# Patient Record
Sex: MALE | Race: WHITE | Employment: FULL TIME | URBAN - METROPOLITAN AREA
[De-identification: names, ages, dates, MRNs, and addresses within clinical notes are randomized per-mention and may not be internally consistent; named-entity substitution may affect disease eponyms.]

---

## 2019-08-26 ENCOUNTER — HOSPITAL ENCOUNTER (OUTPATIENT)
Dept: LAB | Age: 59
Discharge: HOME | End: 2019-08-26
Attending: UROLOGY
Payer: COMMERCIAL

## 2019-08-26 ENCOUNTER — ANESTHESIA EVENT (OUTPATIENT)
Dept: SURGERY | Age: 59
End: 2019-08-26
Payer: COMMERCIAL

## 2019-08-26 PROBLEM — N13.2 HYDRONEPHROSIS WITH RENAL AND URETERAL CALCULOUS OBSTRUCTION: Status: ACTIVE | Noted: 2019-08-26

## 2019-08-26 LAB
ALBUMIN SERPL-MCNC: 3.4 G/DL (ref 3.4–5)
ALBUMIN/GLOB SERPL: 1.2 {RATIO} (ref 0.8–1.7)
ALP SERPL-CCNC: 121 U/L (ref 45–117)
ALT SERPL-CCNC: 22 U/L (ref 16–61)
ANION GAP SERPL CALC-SCNC: 4 MMOL/L (ref 3–18)
AST SERPL-CCNC: 14 U/L (ref 10–38)
BILIRUB SERPL-MCNC: 0.5 MG/DL (ref 0.2–1)
BUN SERPL-MCNC: 24 MG/DL (ref 7–18)
BUN/CREAT SERPL: 13 (ref 12–20)
CALCIUM SERPL-MCNC: 8.4 MG/DL (ref 8.5–10.1)
CHLORIDE SERPL-SCNC: 108 MMOL/L (ref 100–111)
CO2 SERPL-SCNC: 30 MMOL/L (ref 21–32)
CREAT SERPL-MCNC: 1.89 MG/DL (ref 0.6–1.3)
GLOBULIN SER CALC-MCNC: 2.9 G/DL (ref 2–4)
GLUCOSE SERPL-MCNC: 89 MG/DL (ref 74–99)
HCT VFR BLD AUTO: 39.7 % (ref 36–48)
HGB BLD-MCNC: 13.2 G/DL (ref 13–16)
POTASSIUM SERPL-SCNC: 4.3 MMOL/L (ref 3.5–5.5)
PROT SERPL-MCNC: 6.3 G/DL (ref 6.4–8.2)
SODIUM SERPL-SCNC: 142 MMOL/L (ref 136–145)

## 2019-08-26 PROCEDURE — 80053 COMPREHEN METABOLIC PANEL: CPT

## 2019-08-26 PROCEDURE — 36415 COLL VENOUS BLD VENIPUNCTURE: CPT

## 2019-08-26 PROCEDURE — 85018 HEMOGLOBIN: CPT

## 2019-08-27 ENCOUNTER — ANESTHESIA (OUTPATIENT)
Dept: SURGERY | Age: 59
End: 2019-08-27
Payer: COMMERCIAL

## 2019-08-27 ENCOUNTER — HOSPITAL ENCOUNTER (OUTPATIENT)
Age: 59
Setting detail: OUTPATIENT SURGERY
Discharge: HOME | End: 2019-08-27
Attending: UROLOGY | Admitting: UROLOGY
Payer: COMMERCIAL

## 2019-08-27 ENCOUNTER — APPOINTMENT (OUTPATIENT)
Dept: GENERAL RADIOLOGY | Age: 59
End: 2019-08-27
Attending: UROLOGY
Payer: COMMERCIAL

## 2019-08-27 VITALS
RESPIRATION RATE: 14 BRPM | WEIGHT: 219.1 LBS | SYSTOLIC BLOOD PRESSURE: 136 MMHG | HEIGHT: 70 IN | HEART RATE: 62 BPM | OXYGEN SATURATION: 100 % | DIASTOLIC BLOOD PRESSURE: 88 MMHG | TEMPERATURE: 97.1 F | BODY MASS INDEX: 31.37 KG/M2

## 2019-08-27 DIAGNOSIS — N20.0 RENAL CALCULI: Primary | ICD-10-CM

## 2019-08-27 DIAGNOSIS — N13.2 HYDRONEPHROSIS WITH RENAL AND URETERAL CALCULOUS OBSTRUCTION: ICD-10-CM

## 2019-08-27 PROCEDURE — 76060000033 HC ANESTHESIA 1 TO 1.5 HR: Performed by: UROLOGY

## 2019-08-27 PROCEDURE — 74011250636 HC RX REV CODE- 250/636: Performed by: UROLOGY

## 2019-08-27 PROCEDURE — 76210000020 HC REC RM PH II FIRST 0.5 HR: Performed by: UROLOGY

## 2019-08-27 PROCEDURE — 76010000149 HC OR TIME 1 TO 1.5 HR: Performed by: UROLOGY

## 2019-08-27 PROCEDURE — 77030014023 HC SYR INFL LVEEN BSC -B: Performed by: UROLOGY

## 2019-08-27 PROCEDURE — 77030040361 HC SLV COMPR DVT MDII -B: Performed by: UROLOGY

## 2019-08-27 PROCEDURE — 77030010103 HC SEAL PRT ENDOSC OCOA -B: Performed by: UROLOGY

## 2019-08-27 PROCEDURE — 77030020782 HC GWN BAIR PAWS FLX 3M -B: Performed by: UROLOGY

## 2019-08-27 PROCEDURE — C1758 CATHETER, URETERAL: HCPCS | Performed by: UROLOGY

## 2019-08-27 PROCEDURE — 74011250636 HC RX REV CODE- 250/636

## 2019-08-27 PROCEDURE — 76210000016 HC OR PH I REC 1 TO 1.5 HR: Performed by: UROLOGY

## 2019-08-27 PROCEDURE — C2617 STENT, NON-COR, TEM W/O DEL: HCPCS | Performed by: UROLOGY

## 2019-08-27 PROCEDURE — C1894 INTRO/SHEATH, NON-LASER: HCPCS | Performed by: UROLOGY

## 2019-08-27 PROCEDURE — 77030012508 HC MSK AIRWY LMA AMBU -A: Performed by: NURSE ANESTHETIST, CERTIFIED REGISTERED

## 2019-08-27 PROCEDURE — C1769 GUIDE WIRE: HCPCS | Performed by: UROLOGY

## 2019-08-27 PROCEDURE — 82360 CALCULUS ASSAY QUANT: CPT

## 2019-08-27 PROCEDURE — 77030018836 HC SOL IRR NACL ICUM -A: Performed by: UROLOGY

## 2019-08-27 PROCEDURE — 77030006974 HC BSKT URET RTVR BSC -C: Performed by: UROLOGY

## 2019-08-27 PROCEDURE — 74011636320 HC RX REV CODE- 636/320: Performed by: UROLOGY

## 2019-08-27 PROCEDURE — 74420 UROGRAPHY RTRGR +-KUB: CPT

## 2019-08-27 DEVICE — VARIABLE LENGTH URETERAL STENT
Type: IMPLANTABLE DEVICE | Site: URETER | Status: FUNCTIONAL
Brand: CONTOUR VL™

## 2019-08-27 RX ORDER — PROPOFOL 10 MG/ML
INJECTION, EMULSION INTRAVENOUS AS NEEDED
Status: DISCONTINUED | OUTPATIENT
Start: 2019-08-27 | End: 2019-08-27 | Stop reason: HOSPADM

## 2019-08-27 RX ORDER — SODIUM CHLORIDE 0.9 % (FLUSH) 0.9 %
5-40 SYRINGE (ML) INJECTION AS NEEDED
Status: DISCONTINUED | OUTPATIENT
Start: 2019-08-27 | End: 2019-08-27 | Stop reason: HOSPADM

## 2019-08-27 RX ORDER — SODIUM CHLORIDE, SODIUM LACTATE, POTASSIUM CHLORIDE, CALCIUM CHLORIDE 600; 310; 30; 20 MG/100ML; MG/100ML; MG/100ML; MG/100ML
1000 INJECTION, SOLUTION INTRAVENOUS CONTINUOUS
Status: DISCONTINUED | OUTPATIENT
Start: 2019-08-27 | End: 2019-08-27 | Stop reason: HOSPADM

## 2019-08-27 RX ORDER — SODIUM CHLORIDE, SODIUM LACTATE, POTASSIUM CHLORIDE, CALCIUM CHLORIDE 600; 310; 30; 20 MG/100ML; MG/100ML; MG/100ML; MG/100ML
125 INJECTION, SOLUTION INTRAVENOUS CONTINUOUS
Status: DISCONTINUED | OUTPATIENT
Start: 2019-08-27 | End: 2019-08-27 | Stop reason: HOSPADM

## 2019-08-27 RX ORDER — OXYCODONE AND ACETAMINOPHEN 5; 325 MG/1; MG/1
1 TABLET ORAL
Qty: 25 TAB | Refills: 0 | Status: SHIPPED | OUTPATIENT
Start: 2019-08-27 | End: 2019-08-30

## 2019-08-27 RX ORDER — HYDROMORPHONE HYDROCHLORIDE 2 MG/ML
0.5 INJECTION, SOLUTION INTRAMUSCULAR; INTRAVENOUS; SUBCUTANEOUS
Status: DISCONTINUED | OUTPATIENT
Start: 2019-08-27 | End: 2019-08-27

## 2019-08-27 RX ORDER — CEPHALEXIN 500 MG/1
500 CAPSULE ORAL 3 TIMES DAILY
Qty: 15 CAP | Refills: 0 | Status: SHIPPED | OUTPATIENT
Start: 2019-08-27 | End: 2019-09-11

## 2019-08-27 RX ORDER — HYDROMORPHONE HYDROCHLORIDE 2 MG/ML
0.5 INJECTION, SOLUTION INTRAMUSCULAR; INTRAVENOUS; SUBCUTANEOUS
Status: DISCONTINUED | OUTPATIENT
Start: 2019-08-27 | End: 2019-08-27 | Stop reason: HOSPADM

## 2019-08-27 RX ORDER — SODIUM CHLORIDE 0.9 % (FLUSH) 0.9 %
5-40 SYRINGE (ML) INJECTION EVERY 8 HOURS
Status: DISCONTINUED | OUTPATIENT
Start: 2019-08-27 | End: 2019-08-27 | Stop reason: HOSPADM

## 2019-08-27 RX ORDER — FLUMAZENIL 0.1 MG/ML
0.2 INJECTION INTRAVENOUS
Status: DISCONTINUED | OUTPATIENT
Start: 2019-08-27 | End: 2019-08-27 | Stop reason: HOSPADM

## 2019-08-27 RX ORDER — DEXAMETHASONE SODIUM PHOSPHATE 4 MG/ML
INJECTION, SOLUTION INTRA-ARTICULAR; INTRALESIONAL; INTRAMUSCULAR; INTRAVENOUS; SOFT TISSUE AS NEEDED
Status: DISCONTINUED | OUTPATIENT
Start: 2019-08-27 | End: 2019-08-27 | Stop reason: HOSPADM

## 2019-08-27 RX ORDER — FENTANYL CITRATE 50 UG/ML
25 INJECTION, SOLUTION INTRAMUSCULAR; INTRAVENOUS AS NEEDED
Status: DISCONTINUED | OUTPATIENT
Start: 2019-08-27 | End: 2019-08-27 | Stop reason: HOSPADM

## 2019-08-27 RX ORDER — FENTANYL CITRATE 50 UG/ML
INJECTION, SOLUTION INTRAMUSCULAR; INTRAVENOUS AS NEEDED
Status: DISCONTINUED | OUTPATIENT
Start: 2019-08-27 | End: 2019-08-27 | Stop reason: HOSPADM

## 2019-08-27 RX ORDER — NALOXONE HYDROCHLORIDE 0.4 MG/ML
0.1 INJECTION, SOLUTION INTRAMUSCULAR; INTRAVENOUS; SUBCUTANEOUS AS NEEDED
Status: DISCONTINUED | OUTPATIENT
Start: 2019-08-27 | End: 2019-08-27 | Stop reason: HOSPADM

## 2019-08-27 RX ORDER — MIDAZOLAM HYDROCHLORIDE 1 MG/ML
INJECTION, SOLUTION INTRAMUSCULAR; INTRAVENOUS AS NEEDED
Status: DISCONTINUED | OUTPATIENT
Start: 2019-08-27 | End: 2019-08-27 | Stop reason: HOSPADM

## 2019-08-27 RX ORDER — CEFAZOLIN SODIUM/WATER 2 G/20 ML
2 SYRINGE (ML) INTRAVENOUS ONCE
Status: COMPLETED | OUTPATIENT
Start: 2019-08-27 | End: 2019-08-27

## 2019-08-27 RX ORDER — ONDANSETRON 2 MG/ML
INJECTION INTRAMUSCULAR; INTRAVENOUS AS NEEDED
Status: DISCONTINUED | OUTPATIENT
Start: 2019-08-27 | End: 2019-08-27 | Stop reason: HOSPADM

## 2019-08-27 RX ORDER — MAGNESIUM SULFATE 100 %
4 CRYSTALS MISCELLANEOUS AS NEEDED
Status: DISCONTINUED | OUTPATIENT
Start: 2019-08-27 | End: 2019-08-27 | Stop reason: HOSPADM

## 2019-08-27 RX ORDER — LIDOCAINE HYDROCHLORIDE 20 MG/ML
INJECTION, SOLUTION EPIDURAL; INFILTRATION; INTRACAUDAL; PERINEURAL AS NEEDED
Status: DISCONTINUED | OUTPATIENT
Start: 2019-08-27 | End: 2019-08-27 | Stop reason: HOSPADM

## 2019-08-27 RX ADMIN — PROPOFOL 200 MG: 10 INJECTION, EMULSION INTRAVENOUS at 13:14

## 2019-08-27 RX ADMIN — LIDOCAINE HYDROCHLORIDE 100 MG: 20 INJECTION, SOLUTION EPIDURAL; INFILTRATION; INTRACAUDAL; PERINEURAL at 13:14

## 2019-08-27 RX ADMIN — Medication 2 G: at 13:19

## 2019-08-27 RX ADMIN — FENTANYL CITRATE 50 MCG: 50 INJECTION, SOLUTION INTRAMUSCULAR; INTRAVENOUS at 13:24

## 2019-08-27 RX ADMIN — MIDAZOLAM HYDROCHLORIDE 2 MG: 1 INJECTION, SOLUTION INTRAMUSCULAR; INTRAVENOUS at 13:08

## 2019-08-27 RX ADMIN — DEXAMETHASONE SODIUM PHOSPHATE 4 MG: 4 INJECTION, SOLUTION INTRA-ARTICULAR; INTRALESIONAL; INTRAMUSCULAR; INTRAVENOUS; SOFT TISSUE at 13:24

## 2019-08-27 RX ADMIN — SODIUM CHLORIDE, SODIUM LACTATE, POTASSIUM CHLORIDE, AND CALCIUM CHLORIDE 125 ML/HR: 600; 310; 30; 20 INJECTION, SOLUTION INTRAVENOUS at 12:01

## 2019-08-27 RX ADMIN — FENTANYL CITRATE 25 MCG: 50 INJECTION, SOLUTION INTRAMUSCULAR; INTRAVENOUS at 14:17

## 2019-08-27 RX ADMIN — SODIUM CHLORIDE, SODIUM LACTATE, POTASSIUM CHLORIDE, AND CALCIUM CHLORIDE: 600; 310; 30; 20 INJECTION, SOLUTION INTRAVENOUS at 13:53

## 2019-08-27 RX ADMIN — ONDANSETRON 4 MG: 2 INJECTION INTRAMUSCULAR; INTRAVENOUS at 13:24

## 2019-08-27 RX ADMIN — FENTANYL CITRATE 25 MCG: 50 INJECTION, SOLUTION INTRAMUSCULAR; INTRAVENOUS at 13:55

## 2019-08-27 NOTE — H&P
Urology [address]  [address]  Tel: [phone]  Fax: [phone]        Patient: Jose Roberto Roy  YOB: 1960          Assessment/Plan  # Detail Type Description    1. Assessment Hydronephrosis with ureteral calculous obstruction (N13.2). Patient Plan He has bialteral hydro and ureteral stones and bilateral renal stones. 2. Assessment Kidney stone (N20.0). Patient Plan We will take care of the right renal stones at tome of right ureteral stone and treat the left side at a later date if everything goes according to plan. Plan Orders Basic Metabolic Panel (8) to be performed and Comp. Metabolic Panel (14) to be performed. 3. Assessment Other acute renal failure (N17.8). Patient Plan He should improve with stents. This 61year old male presents for Kidney and/or Ureteral Stone. History of Present Illness:  1. Kidney and/or Ureteral Stone   Onset was 3 days ago. Severity level is severe. It occurs intermittently. The problem is worse. Location of pain is right flank. The pain does not radiate. Prior stone type of calcium phosphate. The patient lists no relieving factors. Pertinent negatives include chills, constipation, diarrhea, fever, nausea and vomiting.      6/2019 -- He ahd a left PCNL complicated by severe post-op bleeding and a few embolizations of the left kidney and he had some nerve injury related to the procedure too.         7/2019 : 24-hour urine studies:  low UOP = 0.67 - 1.57 L per day. Very high SSCaOx and low citrate, and high oxalate      8/26/19 -- He had some left flank pain 2 months ago that resolved. Now he has sudden right flank pain and nausea. It was horrible 2 days ago and then subsided. It is improved today, but it was far worse than anything he has had before.     creat = 1.4 (8/23/19)  CT (8/23/19) -- 9mm stone in right ureter with hydro and multiple small stones in left ureter with mild left hydro and a bilateral renal stones. PAST MEDICAL/SURGICAL HISTORY   (Reviewed, updated)    Disease/disorder Onset Date Management Date Comments     Gastric bypass     Kidney stones             PROBLEM LIST:   Problem List reviewed. Problem Description Onset Date Chronic Clinical Status Notes   BMI 40.0-44.9, adult 2016 Y     Insomnia 2014 Y           Medications (active prior to today)  Medication Instructions Start Date Stop Date Refilled Elsewhere   cyanocobalamin (vit B-12) 100 mcg tablet  2017   N   Medrol (Wong) 4 mg tablets in a dose pack take as directed 2019 N       Allergies  Ingredient Reaction (Severity) Medication Name Comment   NO KNOWN ALLERGIES        Family History  (Reviewed, updated)  Relationship Family Member Name  Age at Death Condition Onset Age Cause of Death   Father    Unknown  N   Mother  N  Heart disease; Dementia  N         Social History:  (Reviewed, updated)  Tobacco use reviewed. Preferred language is [place]. MARITAL STATUS/FAMILY/SOCIAL SUPPORT  Currently . Tobacco use status: Ex-cigarette smoker. Smoking status: Former smoker. SMOKING STATUS  Use Status Type Smoking Status Usage Per Day Years Used Total Pack Years   yes Cigarette Former smoker        ALCOHOL  There is a history of alcohol use. consumed socially. Review of Systems  System Neg/Pos Details   Constitutional Negative Chills and Fever. ENMT Negative Ear infections and Sore throat. Eyes Negative Blurred vision, Double vision and Eye pain. Respiratory Negative Asthma, Chronic cough, Dyspnea and Wheezing. Cardio Negative Chest pain. GI Negative Constipation, Decreased appetite, Diarrhea, Nausea and Vomiting. Endocrine Negative Cold intolerance, Heat intolerance, Increased thirst and Weight loss. Neuro Negative Headache and Tremors. Psych Negative Anxiety and Depression.    Integumentary Negative Itching skin and Rash. MS Negative Back pain and Joint pain. Hema/Lymph Negative Easy bleeding. Reproductive Negative Sexual dysfunction. Physical Exam  Exam Findings Details   Constitutional Normal Well developed. Neck Exam Normal Inspection - Normal.   Respiratory Normal Inspection - Normal.   Abdomen Normal No abdominal tenderness. Genitourinary Normal No CVA tenderness. No suprapubic tenderness. Extremity Normal No edema. Psychiatric Normal Orientation - Oriented to time, place, person & situation. Appropriate mood and affect.          Medications (added, continued, or stopped today)  Start Date Medication Directions PRN Status PRN Reason Instruction Stop Date   08/28/2017 cyanocobalamin (vit B-12) 100 mcg tablet  N      01/14/2019 Medrol (Wong) 4 mg tablets in a dose pack take as directed N      Active Patient Care Team Members    Name Contact Agency Type Support Role Relationship Active Date Inactive Date [address]   Emergency Contact Spouse      Brayan Caller   Patient provider PCP          Provider:       Maryellen Calix MD

## 2019-08-27 NOTE — DISCHARGE INSTRUCTIONS
May have some blood tinged urine, urine should be should still be clear. Call Dr Shantal Fuentes if you are unable to urinate, or develop a fever  Will be normal to feel urinary urgency. DISCHARGE SUMMARY from Nurse    PATIENT INSTRUCTIONS:    After general anesthesia or intravenous sedation, for 24 hours or while taking prescription Narcotics:  · Limit your activities  · Do not drive and operate hazardous machinery  · Do not make important personal or business decisions  · Do  not drink alcoholic beverages  · If you have not urinated within 8 hours after discharge, please contact your surgeon on call. Report the following to your surgeon:  · Excessive pain, swelling, redness or odor of or around the surgical area  · Temperature over 100.5  · Nausea and vomiting lasting longer than 4 hours or if unable to take medications  · Any signs of decreased circulation or nerve impairment to extremity: change in color, persistent  numbness, tingling, coldness or increase pain  · Any questions    What to do at Home:  Recommended activity: Activity as tolerated and no driving for today, no driving if taking pain meds    *  Please give a list of your current medications to your Primary Care Provider. *  Please update this list whenever your medications are discontinued, doses are      changed, or new medications (including over-the-counter products) are added. *  Please carry medication information at all times in case of emergency situations. These are general instructions for a healthy lifestyle:    No smoking/ No tobacco products/ Avoid exposure to second hand smoke  Surgeon General's Warning:  Quitting smoking now greatly reduces serious risk to your health.     Obesity, smoking, and sedentary lifestyle greatly increases your risk for illness    A healthy diet, regular physical exercise & weight monitoring are important for maintaining a healthy lifestyle    You may be retaining fluid if you have a history of heart failure or if you experience any of the following symptoms:  Weight gain of 3 pounds or more overnight or 5 pounds in a week, increased swelling in our hands or feet or shortness of breath while lying flat in bed. Please call your doctor as soon as you notice any of these symptoms; do not wait until your next office visit. The discharge information has been reviewed with the patient and caregiver. The patient and caregiver verbalized understanding. Discharge medications reviewed with the patient and caregiver and appropriate educational materials and side effects teaching were provided.   ___________________________________________________________________________________________________________________________________

## 2019-08-27 NOTE — BRIEF OP NOTE
BRIEF OPERATIVE NOTE    Date of Procedure: 8/27/2019   Preoperative Diagnosis: KIDNEY STONES  Postoperative Diagnosis: KIDNEY STONES    Procedure(s):  CYSTOSCOPY,  LEFT RETROGRADE PYELOGRAM WITH INTERPRETATION, LEFT STENT PLACEMENT, RIGHT RETROGRADE PYELOGRAM WITH INTERPRETATION, RIGHT URETEROSCOPY WITH HOLMIUM LASER LITHOTRIPSY OF URETERAL STONE, RRIGHT URETEROSCOPY AND HOLMIUM LASER LITHOTRIPSY OF RIGHT RENAL STONES. RIGHT STENT PLACEMENT WITH C-ARM  Surgeon(s) and Role:     * Krystle Maher MD - Primary         Surgical Assistant: NONE    Surgical Staff:  Circ-1: Cordell Adrian  Radiology Technician: Mateus Pool  Scrub Tech-1: Kiki Parker  Scrub Tech-2: Essie Guzman  Event Time In Time Out   Incision Start 1325    Incision Close 1419      Anesthesia: General   Estimated Blood Loss: MINIMAL  Specimens:   ID Type Source Tests Collected by Time Destination   1 : RIGHT KIDNEY STONES Fresh Kidney  Krystle Maher MD 8/27/2019 1355 Pathology      Findings: HUGE PROSTATE WITH MEDIAN LOBE COVERING TRIGONE. LEFT HYDRO WITH SOME CALYCEAL BLUNTING SECONDARY TO DISTAL URETERAL STONES, LARGE RIGHT URETERAL STONE AT UVJ THAT WAS HARD. Complications: NONE  Implants:   Implant Name Type Inv.  Item Serial No.  Lot No. LRB No. Used Action   Oliver Slack CONTOUR [id number] -- [facility] - [id number]  Oliver Slack CONTOUR 7.9LCG51-90 -- [employer] [facility] [id number] Right 1 Implanted   STENT URET CONTOUR 8.8LON76-15 -- [facility] - [id number]  Oliver Slack CONTOUR 8.1ISN53-56 -- [facility] [id number] Left 1 Implanted

## 2019-08-27 NOTE — ANESTHESIA POSTPROCEDURE EVALUATION
Post-Anesthesia Evaluation & Assessment    Visit Vitals  /72   Pulse (!) 52   Temp 36.2 °C (97.1 °F)   Resp 13   Ht 5' 10\" (1.778 m)   Wt 99.4 kg (219 lb 1.6 oz)   SpO2 100%   BMI 31.44 kg/m²       Nausea/Vomiting: Controlled. Post-operative hydration adequate. Pain Scale 1: Numeric (0 - 10) (08/27/19 1512)  Pain Intensity 1: 0 (08/27/19 1512)   Managed    Pain score at or below stated goal level. Mental status & Level of consciousness: alert and oriented x 3    Neurological status: moves all extremities, sensation grossly intact    Pulmonary status: airway patent, adequate oxygenation. Complications related to anesthesia: none    Patient has met all PACU discharge requirements.       Jonathan Barrios DO

## 2019-08-27 NOTE — INTERVAL H&P NOTE
H&P Update:  Ute Mcgee was seen and examined. History and physical has been reviewed. The patient has been examined.  There have been no significant clinical changes since the completion of the originally dated History and Physical.

## 2019-08-27 NOTE — PERIOP NOTES
Reviewed PTA medication list with patient/caregiver and patient/caregiver denies any additional medications.      Patient admits to having a responsible adult care for them for at least 24 hours after surgery.

## 2019-08-27 NOTE — H&P (VIEW-ONLY)
Urology [address] [place]  [address]  Tel: [phone]  Fax: [phone]        Patient: Ghazal Talbert  YOB: 1960          Assessment/Plan  # Detail Type Description    1. Assessment Hydronephrosis with ureteral calculous obstruction (N13.2). Patient Plan He has bialteral hydro and ureteral stones and bilateral renal stones. 2. Assessment Kidney stone (N20.0). Patient Plan We will take care of the right renal stones at tome of right ureteral stone and treat the left side at a later date if everything goes according to plan. Plan Orders Basic Metabolic Panel (8) to be performed and Comp. Metabolic Panel (14) to be performed. 3. Assessment Other acute renal failure (N17.8). Patient Plan He should improve with stents. This 61year old male presents for Kidney and/or Ureteral Stone. History of Present Illness:  1. Kidney and/or Ureteral Stone   Onset was 3 days ago. Severity level is severe. It occurs intermittently. The problem is worse. Location of pain is right flank. The pain does not radiate. Prior stone type of calcium phosphate. The patient lists no relieving factors. Pertinent negatives include chills, constipation, diarrhea, fever, nausea and vomiting.      6/2019 -- He ahd a left PCNL complicated by severe post-op bleeding and a few embolizations of the left kidney and he had some nerve injury related to the procedure too.         7/2019 : 24-hour urine studies:  low UOP = 0.67 - 1.57 L per day. Very high SSCaOx and low citrate, and high oxalate      8/26/19 -- He had some left flank pain 2 months ago that resolved. Now he has sudden right flank pain and nausea. It was horrible 2 days ago and then subsided. It is improved today, but it was far worse than anything he has had before.     creat = 1.4 (8/23/19)  CT (8/23/19) -- 9mm stone in right ureter with hydro and multiple small stones in left ureter with mild left hydro and a bilateral renal stones. PAST MEDICAL/SURGICAL HISTORY   (Reviewed, updated)    Disease/disorder Onset Date Management Date Comments     Gastric bypass     Kidney stones             PROBLEM LIST:   Problem List reviewed. Problem Description Onset Date Chronic Clinical Status Notes   BMI 40.0-44.9, adult 2016 Y     Insomnia 2014 Y           Medications (active prior to today)  Medication Instructions Start Date Stop Date Refilled Elsewhere   cyanocobalamin (vit B-12) 100 mcg tablet  2017   N   Medrol (Wong) 4 mg tablets in a dose pack take as directed 2019 N       Allergies  Ingredient Reaction (Severity) Medication Name Comment   NO KNOWN ALLERGIES        Family History  (Reviewed, updated)  Relationship Family Member Name  Age at Death Condition Onset Age Cause of Death   Father    Unknown  N   Mother  N  Heart disease; Dementia  N         Social History:  (Reviewed, updated)  Tobacco use reviewed. Preferred language is [place]. MARITAL STATUS/FAMILY/SOCIAL SUPPORT  Currently . Tobacco use status: Ex-cigarette smoker. Smoking status: Former smoker. SMOKING STATUS  Use Status Type Smoking Status Usage Per Day Years Used Total Pack Years   yes Cigarette Former smoker        ALCOHOL  There is a history of alcohol use. consumed socially. Review of Systems  System Neg/Pos Details   Constitutional Negative Chills and Fever. ENMT Negative Ear infections and Sore throat. Eyes Negative Blurred vision, Double vision and Eye pain. Respiratory Negative Asthma, Chronic cough, Dyspnea and Wheezing. Cardio Negative Chest pain. GI Negative Constipation, Decreased appetite, Diarrhea, Nausea and Vomiting. Endocrine Negative Cold intolerance, Heat intolerance, Increased thirst and Weight loss. Neuro Negative Headache and Tremors. Psych Negative Anxiety and Depression.    Integumentary Negative Itching skin and Rash. MS Negative Back pain and Joint pain. Hema/Lymph Negative Easy bleeding. Reproductive Negative Sexual dysfunction. Physical Exam  Exam Findings Details   Constitutional Normal Well developed. Neck Exam Normal Inspection - Normal.   Respiratory Normal Inspection - Normal.   Abdomen Normal No abdominal tenderness. Genitourinary Normal No CVA tenderness. No suprapubic tenderness. Extremity Normal No edema. Psychiatric Normal Orientation - Oriented to time, place, person & situation. Appropriate mood and affect.          Medications (added, continued, or stopped today)  Start Date Medication Directions PRN Status PRN Reason Instruction Stop Date   08/28/2017 cyanocobalamin (vit B-12) 100 mcg tablet  N      01/14/2019 Medrol (Wong) 4 mg tablets in a dose pack take as directed N      Active Patient Care Team Members    Name Contact Agency Type Support Role Relationship Active Date Inactive Date [address]   Emergency Contact Spouse      Marilin Nevarez   Patient provider PCP          Provider:       Cristo De La Rosa MD

## 2019-08-28 NOTE — OP NOTES
[facility] [place]  OPERATIVE REPORT    Name:  Fela Kearns  MR#:   [id number]  :  1960  ACCOUNT #:  [de-identified]  DATE OF SERVICE:  2019    PREOPERATIVE DIAGNOSES:  Bilateral kidney stones and bilateral ureteral stones with hydronephrosis. POSTOPERATIVE DIAGNOSES:  Bilateral kidney stones and bilateral renal stones with hydronephrosis. PROCEDURE PERFORMED:  Cystoscopy, left retrograde pyelogram with interpretation, left stent placement and a right retrograde pyelogram with interpretation, right ureteroscopy with holmium laser lithotripsy of the ureteral stone and right ureteroscopy with holmium laser lithotripsy of renal stone, and right stent placement. SURGEON:  Augustina Graham MD    ASSISTANT:  None. ANESTHESIA:  General.    COMPLICATIONS:  None. SPECIMENS REMOVED:  Right kidney and ureteral stone. IMPLANTS:  4.8-Ugandan variable-length stent bilaterally. ESTIMATED BLOOD LOSS:  Minimal.    FINDINGS:  The patient had a huge prostate with median lobe that was covering the trigone. He also had some left hydronephrosis with some calyceal blunting secondary to distal ureteral stones. He also had a large right ureteral stone at the UVJ that was very hard. CLINICAL NOTE:  The patient is a 19-year-old gentleman with a history of bilateral staghorn stones, who has had a prior PCNL. He had certain onset of right-sided flank pain and was noted to have a bilateral ureteral stones with hydro worse on the right side compared to the left. He also had bilateral renal stones seen on the same CT scan from 2019. He presents for a ureteroscopy and laser lithotripsy. PROCEDURE:  Preoperatively, the risks and benefits of surgery were described to the patient where the risks include, but are not limited to bleeding, infection, injury to the bladder, injury to the ureter, injury to the kidney, and possible need for future procedures.   The patient understood the risks and signed the informed consent. The patient was taken to the operating room, placed on the OR table in supine position. He was administered general anesthetic. He was administered intravenous antibiotics. He was placed in dorsal lithotomy position, prepped and draped in the usual sterile manner. A 21-Tamazight cystoscope and sheath were then inserted transurethrally atraumatically under direct vision using a 30-degree lens. Panendoscopy was done. The urethra was normal.  The prostatic urethra was very much compromised by very large median lobe as well as right and left lobar hypertrophy. The median lobe emerged into the bladder and covered the trigone. This made the access into the ureteral orifice very difficult. There were no stones, no tumors, and no areas of concern within the bladder. The ureteral orifices were in normal position, but they were covered by the median lobe of the prostate. Next, I passed an open-ended catheter through my scope and I was able to maneuver my scope to push the median lobe out of the way, and I was able to access the right ureteral orifice. I cannulated the right ureteral orifice. I did a retrograde pyelogram in the usual manner using 14 mL Visipaque dye. There was noted to be a significant filling defect in the distal ureter consistent with stone. Proximal to this, there was a severe hydroureteronephrosis. There was no blunting of calyces. No other filling defects were seen along the length of the ureter up in the kidney. Next, I passed a sensor wire through the open-ended catheter up to the kidney. The open-ended catheter was removed. The scope was removed. I then passed a semi-rigid ureteroscope transurethrally atraumatically under vision through the urethra into the bladder. I then cannulated the orifice with that and medially I came up with the right ureteral orifice and medially came up on the stone.   Using a 272-micron holmium laser fiber, I broke up the stone into small fragments and extracted them out with Zero-Tip basket. The stone was quite hard and did take some prolonged lasering time. I then advanced my scope further up to ureter. There was no significant fragments at all in the ureter. Everything had been extracted. Next, I passed a sensor wire through my scope up to the kidney. The scope was then removed. Over that wire, I then passed a 13/15 x 46 cm navigator sheath over the wire up to the kidney. The wire was removed. Inner workings of the sheath were removed. I then passed a flexible ureteroscope through the sheath up to the kidney. Panpyeloscopy was done. There was noted to be a couple of small stones in the right kidney. These were lasered using 272-micron holmium laser fiber and the fragments were extracted. I  went to every calyx multiple times and there were no significant stones anywhere left. The scope and the sheath were then slowly backed down the ureter. The ureter was intact and unharmed. I then reinserted the cystoscope. Over the safety wire, I then passed a 4.8-Yi variable length stent up to the kidney. The wire was removed. The fluoroscopy confirmed the proximal coil within the kidney and the distal coil was noted to be in the bladder by direct vision. I then turned my attention over the left side. I cannulated the left ureteral orifice with a 5-Yi open-ended ureteral catheter with some difficulty due to the median lobe of the prostate. A retrograde pyelogram was done again using 14 mL of Visipaque dye. There were filling defects in the distal ureter consistent with stone. Proximal to this, there was a moderate hydroureteronephrosis. There was blunting of calyces that was mild on the left side. There  was no filling defects up in the kidney. Next, I passed a sensor wire through the open-ended catheter up to the kidney. The open-ended catheter was removed.   I then passed a 4.8-Yi variable length stent over the wire up to the kidney. The wire was removed. Fluoroscopy confirmed the proximal coil was in the kidney and the distal coil was noted to be in the bladder by direction vision. At this point, the patient was taken out of the lithotomy position, revived from anesthesia, and taken to recovery in stable condition.       Mayra Del Valle MD      [id number]  D:  08/27/2019 15:12  T:  08/27/2019 20:11  JOB #:  [id number]

## 2019-09-05 LAB
CA PHOS MFR STONE: 10 %
CALCIUM OXALATE DIHYDRATE MFR STONE IR: 15 %
COLOR STONE: NORMAL
COM MFR STONE: 75 %
COMMENT, 519994: NORMAL
COMPOSITION, 120440: NORMAL
DISCLAIMER, STO32L: NORMAL
NIDUS STONE QL: NORMAL
SIZE STONE: NORMAL MM
WT STONE: 68 MG

## 2019-09-16 ENCOUNTER — ANESTHESIA EVENT (OUTPATIENT)
Dept: SURGERY | Age: 59
End: 2019-09-16
Payer: COMMERCIAL

## 2019-09-17 ENCOUNTER — HOSPITAL ENCOUNTER (OUTPATIENT)
Age: 59
Setting detail: OUTPATIENT SURGERY
Discharge: HOME | End: 2019-09-17
Attending: UROLOGY | Admitting: UROLOGY
Payer: COMMERCIAL

## 2019-09-17 ENCOUNTER — APPOINTMENT (OUTPATIENT)
Dept: GENERAL RADIOLOGY | Age: 59
End: 2019-09-17
Attending: UROLOGY
Payer: COMMERCIAL

## 2019-09-17 ENCOUNTER — ANESTHESIA (OUTPATIENT)
Dept: SURGERY | Age: 59
End: 2019-09-17
Payer: COMMERCIAL

## 2019-09-17 VITALS
RESPIRATION RATE: 18 BRPM | OXYGEN SATURATION: 96 % | HEART RATE: 60 BPM | TEMPERATURE: 97.5 F | HEIGHT: 70 IN | BODY MASS INDEX: 30.53 KG/M2 | DIASTOLIC BLOOD PRESSURE: 78 MMHG | WEIGHT: 213.25 LBS | SYSTOLIC BLOOD PRESSURE: 127 MMHG

## 2019-09-17 PROCEDURE — 74011250636 HC RX REV CODE- 250/636: Performed by: UROLOGY

## 2019-09-17 PROCEDURE — 77030006974 HC BSKT URET RTVR BSC -C: Performed by: UROLOGY

## 2019-09-17 PROCEDURE — 77030020782 HC GWN BAIR PAWS FLX 3M -B: Performed by: UROLOGY

## 2019-09-17 PROCEDURE — 77030010103 HC SEAL PRT ENDOSC OCOA -B: Performed by: UROLOGY

## 2019-09-17 PROCEDURE — 76210000021 HC REC RM PH II 0.5 TO 1 HR: Performed by: UROLOGY

## 2019-09-17 PROCEDURE — C2617 STENT, NON-COR, TEM W/O DEL: HCPCS | Performed by: UROLOGY

## 2019-09-17 PROCEDURE — 82360 CALCULUS ASSAY QUANT: CPT

## 2019-09-17 PROCEDURE — 74011000250 HC RX REV CODE- 250

## 2019-09-17 PROCEDURE — 74420 UROGRAPHY RTRGR +-KUB: CPT

## 2019-09-17 PROCEDURE — 76010000149 HC OR TIME 1 TO 1.5 HR: Performed by: UROLOGY

## 2019-09-17 PROCEDURE — 76210000006 HC OR PH I REC 0.5 TO 1 HR: Performed by: UROLOGY

## 2019-09-17 PROCEDURE — 74011636320 HC RX REV CODE- 636/320: Performed by: UROLOGY

## 2019-09-17 PROCEDURE — 74011250636 HC RX REV CODE- 250/636

## 2019-09-17 PROCEDURE — 77030018836 HC SOL IRR NACL ICUM -A: Performed by: UROLOGY

## 2019-09-17 PROCEDURE — C1769 GUIDE WIRE: HCPCS | Performed by: UROLOGY

## 2019-09-17 PROCEDURE — 74011000250 HC RX REV CODE- 250: Performed by: UROLOGY

## 2019-09-17 PROCEDURE — C1758 CATHETER, URETERAL: HCPCS | Performed by: UROLOGY

## 2019-09-17 PROCEDURE — 76060000033 HC ANESTHESIA 1 TO 1.5 HR: Performed by: UROLOGY

## 2019-09-17 PROCEDURE — 77030040361 HC SLV COMPR DVT MDII -B: Performed by: UROLOGY

## 2019-09-17 PROCEDURE — 74011250637 HC RX REV CODE- 250/637: Performed by: ANESTHESIOLOGY

## 2019-09-17 DEVICE — VARIABLE LENGTH URETERAL STENT
Type: IMPLANTABLE DEVICE | Site: URETER | Status: FUNCTIONAL
Brand: CONTOUR VL™

## 2019-09-17 RX ORDER — SODIUM CHLORIDE 0.9 % (FLUSH) 0.9 %
5-40 SYRINGE (ML) INJECTION EVERY 8 HOURS
Status: DISCONTINUED | OUTPATIENT
Start: 2019-09-17 | End: 2019-09-17 | Stop reason: HOSPADM

## 2019-09-17 RX ORDER — LIDOCAINE HYDROCHLORIDE 20 MG/ML
INJECTION, SOLUTION EPIDURAL; INFILTRATION; INTRACAUDAL; PERINEURAL AS NEEDED
Status: DISCONTINUED | OUTPATIENT
Start: 2019-09-17 | End: 2019-09-17 | Stop reason: HOSPADM

## 2019-09-17 RX ORDER — PROPOFOL 10 MG/ML
INJECTION, EMULSION INTRAVENOUS AS NEEDED
Status: DISCONTINUED | OUTPATIENT
Start: 2019-09-17 | End: 2019-09-17 | Stop reason: HOSPADM

## 2019-09-17 RX ORDER — HYDROMORPHONE HYDROCHLORIDE 2 MG/ML
0.2 INJECTION, SOLUTION INTRAMUSCULAR; INTRAVENOUS; SUBCUTANEOUS AS NEEDED
Status: DISCONTINUED | OUTPATIENT
Start: 2019-09-17 | End: 2019-09-17 | Stop reason: HOSPADM

## 2019-09-17 RX ORDER — ONDANSETRON 2 MG/ML
4 INJECTION INTRAMUSCULAR; INTRAVENOUS ONCE
Status: DISCONTINUED | OUTPATIENT
Start: 2019-09-17 | End: 2019-09-17 | Stop reason: HOSPADM

## 2019-09-17 RX ORDER — MAGNESIUM SULFATE 100 %
4 CRYSTALS MISCELLANEOUS AS NEEDED
Status: DISCONTINUED | OUTPATIENT
Start: 2019-09-17 | End: 2019-09-17 | Stop reason: HOSPADM

## 2019-09-17 RX ORDER — CEPHALEXIN 500 MG/1
500 CAPSULE ORAL 3 TIMES DAILY
Qty: 15 CAP | Refills: 0 | Status: SHIPPED | OUTPATIENT
Start: 2019-09-17 | End: 2019-09-22

## 2019-09-17 RX ORDER — ONDANSETRON 2 MG/ML
INJECTION INTRAMUSCULAR; INTRAVENOUS AS NEEDED
Status: DISCONTINUED | OUTPATIENT
Start: 2019-09-17 | End: 2019-09-17 | Stop reason: HOSPADM

## 2019-09-17 RX ORDER — HYDROCODONE BITARTRATE AND ACETAMINOPHEN 5; 325 MG/1; MG/1
1 TABLET ORAL AS NEEDED
Status: DISCONTINUED | OUTPATIENT
Start: 2019-09-17 | End: 2019-09-17 | Stop reason: HOSPADM

## 2019-09-17 RX ORDER — DIPHENHYDRAMINE HYDROCHLORIDE 50 MG/ML
12.5 INJECTION, SOLUTION INTRAMUSCULAR; INTRAVENOUS
Status: DISCONTINUED | OUTPATIENT
Start: 2019-09-17 | End: 2019-09-17 | Stop reason: HOSPADM

## 2019-09-17 RX ORDER — FENTANYL CITRATE 50 UG/ML
25 INJECTION, SOLUTION INTRAMUSCULAR; INTRAVENOUS
Status: DISCONTINUED | OUTPATIENT
Start: 2019-09-17 | End: 2019-09-17 | Stop reason: HOSPADM

## 2019-09-17 RX ORDER — SODIUM CHLORIDE 0.9 % (FLUSH) 0.9 %
5-40 SYRINGE (ML) INJECTION AS NEEDED
Status: DISCONTINUED | OUTPATIENT
Start: 2019-09-17 | End: 2019-09-17 | Stop reason: HOSPADM

## 2019-09-17 RX ORDER — MIDAZOLAM HYDROCHLORIDE 1 MG/ML
INJECTION, SOLUTION INTRAMUSCULAR; INTRAVENOUS AS NEEDED
Status: DISCONTINUED | OUTPATIENT
Start: 2019-09-17 | End: 2019-09-17 | Stop reason: HOSPADM

## 2019-09-17 RX ORDER — SODIUM CHLORIDE, SODIUM LACTATE, POTASSIUM CHLORIDE, CALCIUM CHLORIDE 600; 310; 30; 20 MG/100ML; MG/100ML; MG/100ML; MG/100ML
150 INJECTION, SOLUTION INTRAVENOUS CONTINUOUS
Status: DISCONTINUED | OUTPATIENT
Start: 2019-09-17 | End: 2019-09-17 | Stop reason: HOSPADM

## 2019-09-17 RX ORDER — NALOXONE HYDROCHLORIDE 0.4 MG/ML
0.1 INJECTION, SOLUTION INTRAMUSCULAR; INTRAVENOUS; SUBCUTANEOUS AS NEEDED
Status: DISCONTINUED | OUTPATIENT
Start: 2019-09-17 | End: 2019-09-17 | Stop reason: HOSPADM

## 2019-09-17 RX ORDER — SODIUM CHLORIDE, SODIUM LACTATE, POTASSIUM CHLORIDE, CALCIUM CHLORIDE 600; 310; 30; 20 MG/100ML; MG/100ML; MG/100ML; MG/100ML
125 INJECTION, SOLUTION INTRAVENOUS CONTINUOUS
Status: DISCONTINUED | OUTPATIENT
Start: 2019-09-17 | End: 2019-09-17 | Stop reason: HOSPADM

## 2019-09-17 RX ORDER — FENTANYL CITRATE 50 UG/ML
INJECTION, SOLUTION INTRAMUSCULAR; INTRAVENOUS AS NEEDED
Status: DISCONTINUED | OUTPATIENT
Start: 2019-09-17 | End: 2019-09-17 | Stop reason: HOSPADM

## 2019-09-17 RX ORDER — DEXTROSE MONOHYDRATE 100 MG/ML
125-250 INJECTION, SOLUTION INTRAVENOUS AS NEEDED
Status: DISCONTINUED | OUTPATIENT
Start: 2019-09-17 | End: 2019-09-17 | Stop reason: HOSPADM

## 2019-09-17 RX ORDER — ALBUTEROL SULFATE 0.83 MG/ML
2.5 SOLUTION RESPIRATORY (INHALATION) AS NEEDED
Status: DISCONTINUED | OUTPATIENT
Start: 2019-09-17 | End: 2019-09-17 | Stop reason: HOSPADM

## 2019-09-17 RX ADMIN — SODIUM CHLORIDE, SODIUM LACTATE, POTASSIUM CHLORIDE, AND CALCIUM CHLORIDE: 600; 310; 30; 20 INJECTION, SOLUTION INTRAVENOUS at 08:43

## 2019-09-17 RX ADMIN — FENTANYL CITRATE 50 MCG: 50 INJECTION, SOLUTION INTRAMUSCULAR; INTRAVENOUS at 08:45

## 2019-09-17 RX ADMIN — LIDOCAINE HYDROCHLORIDE 80 MG: 20 INJECTION, SOLUTION EPIDURAL; INFILTRATION; INTRACAUDAL; PERINEURAL at 08:46

## 2019-09-17 RX ADMIN — MIDAZOLAM HYDROCHLORIDE 2 MG: 1 INJECTION, SOLUTION INTRAMUSCULAR; INTRAVENOUS at 08:42

## 2019-09-17 RX ADMIN — WATER 2 G: 1 INJECTION INTRAMUSCULAR; INTRAVENOUS; SUBCUTANEOUS at 08:52

## 2019-09-17 RX ADMIN — SODIUM CHLORIDE, SODIUM LACTATE, POTASSIUM CHLORIDE, AND CALCIUM CHLORIDE: 600; 310; 30; 20 INJECTION, SOLUTION INTRAVENOUS at 09:48

## 2019-09-17 RX ADMIN — SODIUM CHLORIDE, SODIUM LACTATE, POTASSIUM CHLORIDE, AND CALCIUM CHLORIDE 125 ML/HR: 600; 310; 30; 20 INJECTION, SOLUTION INTRAVENOUS at 07:33

## 2019-09-17 RX ADMIN — FENTANYL CITRATE 50 MCG: 50 INJECTION, SOLUTION INTRAMUSCULAR; INTRAVENOUS at 08:59

## 2019-09-17 RX ADMIN — PROPOFOL 200 MG: 10 INJECTION, EMULSION INTRAVENOUS at 08:47

## 2019-09-17 RX ADMIN — HYDROCODONE BITARTRATE AND ACETAMINOPHEN 1 TABLET: 5; 325 TABLET ORAL at 11:10

## 2019-09-17 RX ADMIN — ONDANSETRON 4 MG: 2 INJECTION INTRAMUSCULAR; INTRAVENOUS at 09:23

## 2019-09-17 NOTE — BRIEF OP NOTE
BRIEF OPERATIVE NOTE    Date of Procedure: 9/17/2019   Preoperative Diagnosis: LEFT URETERAL AND RENAL STONES  Postoperative Diagnosis: LEFT URETERAL AND RENAL STONES    Procedure(s):  CYSTOSCOPY, RIGHT STENT REMOVAL,  LEFT RETROGRADE PYELOGRAM WITH INTERPRETATION, LEFT URETEROSCOPY WITH HOLMIUM LASER LITHOTRIPSY OF LEFT URETERAL STONE AND LEFT URETEROSCOPY WITH HOLMIUM LASER LITHOTRIPSY OF LEFT RENAL STONE, LEFT STENT CHANGE WITH C-ARM  Surgeon(s) and Role:     * Channing Bhatt MD - Primary         Surgical Assistant: NONE    Surgical Staff:  Circ-1: Judy Mccoy RN  Circ-2: Ekaterina Nolan RN  Radiology Technician: Olga Castellanos  Scrub RN-1: Lawanda Schmitt RN  Float Staff: Anita Ponce RN  Event Time In Time Out   Incision Start 0901    Incision Close 3646      Anesthesia: Other   Estimated Blood Loss: MINIMAL  Specimens:   ID Type Source Tests Collected by Time Destination   1 : Left Ureteral Stone Fresh URETER, LEFT  Channing Bhatt MD 9/17/2019 9578 Pathology      Findings: HUGE STONE IMPACTED IN DISTAL LEFT URETER, LARGE STONE IMPACTED IN MID LEFT URETER, SMALL RENAL STONE ALL LASERED AND MOST FRAGMENTS EXTRACTED   Complications: NONE  Implants:   Implant Name Type Inv.  Item Serial No.  Lot No. LRB No. Used Action   Hola Diaz CONTOUR [id number] -- [facility] - [id number]  Armarco Joe CONTOUR [id number] -- [facility] [id number] 1 Implanted

## 2019-09-17 NOTE — PERIOP NOTES
Reviewed PTA medication list with patient/caregiver and patient/caregiver denies any additional medications. Patient admits to having a responsible adult care for them for at least 24 hours after surgery.     Dual skin assessment performed with Melvin Alejandro RN.

## 2019-09-17 NOTE — DISCHARGE INSTRUCTIONS
Resume pre hospital diet  Drink plenty of fluids to keep the urine clear  Ambulate in house  Take prescription as directed  Shower tomorrow    Dr. Micheal Wheeler office will call with follow up appointment        DISCHARGE SUMMARY from Nurse    PATIENT INSTRUCTIONS:    After general anesthesia or intravenous sedation, for 24 hours or while taking prescription Narcotics:  · Limit your activities  · Do not drive and operate hazardous machinery  · Do not make important personal or business decisions  · Do  not drink alcoholic beverages  · If you have not urinated within 8 hours after discharge, please contact your surgeon on call. Report the following to your surgeon:  · Excessive pain, swelling, redness or odor of or around the surgical area  · Temperature over 100.5  · Nausea and vomiting lasting longer than 4 hours or if unable to take medications  · Any signs of decreased circulation or nerve impairment to extremity: change in color, persistent  numbness, tingling, coldness or increase pain  · Any questions    What to do at Home:  Recommended activity: Ambulate in house and No driving while on analgesics,     If you experience any of the following symptoms fever, chills, uncontrollable pain , active bleeding  ( thick bloody urine with clots ) , difficulty urinating , nausea, vomiting, please follow up with Dr. Monico Stephenson. *  Please give a list of your current medications to your Primary Care Provider. *  Please update this list whenever your medications are discontinued, doses are      changed, or new medications (including over-the-counter products) are added. *  Please carry medication information at all times in case of emergency situations. These are general instructions for a healthy lifestyle:    No smoking/ No tobacco products/ Avoid exposure to second hand smoke  Surgeon General's Warning:  Quitting smoking now greatly reduces serious risk to your health.     Obesity, smoking, and sedentary lifestyle greatly increases your risk for illness    A healthy diet, regular physical exercise & weight monitoring are important for maintaining a healthy lifestyle    You may be retaining fluid if you have a history of heart failure or if you experience any of the following symptoms:  Weight gain of 3 pounds or more overnight or 5 pounds in a week, increased swelling in our hands or feet or shortness of breath while lying flat in bed. Please call your doctor as soon as you notice any of these symptoms; do not wait until your next office visit. Patient armband removed and shredded    The discharge information has been reviewed with the patient and caregiver. The patient and caregiver verbalized understanding. Discharge medications reviewed with the patient and caregiver and appropriate educational materials and side effects teaching were provided.   ___________________________________________________________________________________________________________________________________

## 2019-09-17 NOTE — ANESTHESIA POSTPROCEDURE EVALUATION
Procedure(s):  CYSTOSCOPY, RIGHT STENT REMOVAL LEFT RETROGRADE PYELOGRAM, LEFT URETEROSCOPY, LASER LITHOTRIPSY WITH HOLMIUM LEFT STENT CHANGE WITH C-ARM. general    Anesthesia Post Evaluation      Multimodal analgesia: multimodal analgesia used between 6 hours prior to anesthesia start to PACU discharge  Patient location during evaluation: PACU  Patient participation: complete - patient participated  Level of consciousness: awake  Pain management: adequate  Airway patency: patent  Anesthetic complications: no  Cardiovascular status: acceptable  Respiratory status: acceptable  Hydration status: acceptable  Post anesthesia nausea and vomiting:  none      Vitals Value Taken Time   /93 9/17/2019 10:45 AM   Temp 36.5 °C (97.7 °F) 9/17/2019 10:10 AM   Pulse 68 9/17/2019 10:48 AM   Resp 17 9/17/2019 10:48 AM   SpO2 94 % 9/17/2019 10:48 AM   Vitals shown include unvalidated device data.

## 2019-09-17 NOTE — INTERVAL H&P NOTE
H&P Update:  Margot Delacruz was seen and examined. History and physical has been reviewed. Significant clinical changes have occurred as noted:  He had a cysto and right ureteroscopy and laser lithotripsy and bilateral stent placement. He is doing okay without fevers or chills and appetite is fine. However, he does have some hematuria that worsens with activity.

## 2019-09-17 NOTE — ANESTHESIA PREPROCEDURE EVALUATION
Relevant Problems   No relevant active problems       Anesthetic History   No history of anesthetic complications            Review of Systems / Medical History  Patient summary reviewed, nursing notes reviewed and pertinent labs reviewed    Pulmonary  Within defined limits                 Neuro/Psych   Within defined limits           Cardiovascular                  Exercise tolerance: >4 METS     GI/Hepatic/Renal     GERD           Endo/Other  Within defined limits           Other Findings              Physical Exam    Airway  Mallampati: II  TM Distance: 4 - 6 cm  Neck ROM: normal range of motion   Mouth opening: Normal     Cardiovascular  Regular rate and rhythm,  S1 and S2 normal,  no murmur, click, rub, or gallop             Dental         Pulmonary  Breath sounds clear to auscultation               Abdominal  GI exam deferred       Other Findings            Anesthetic Plan    ASA: 2  Anesthesia type: general          Induction: Intravenous  Anesthetic plan and risks discussed with: Patient

## 2019-09-17 NOTE — OP NOTES
[facility]  OPERATIVE REPORT    Name:  Cleopatra De La Cruz  MR#:   [id number]  :  1960  ACCOUNT #:  [de-identified]  DATE OF SERVICE:  2019    PREOPERATIVE DIAGNOSIS:  Left ureteral and left renal stones. POSTOPERATIVE DIAGNOSIS:  Left ureteral and left renal stones. PROCEDURE PERFORMED:  Cystoscopy with right stent removal, left retrograde pyelogram with interpretation, left uteroscopy with holmium laser lithotripsy of ureteral stone, and left ureteroscopy with holmium laser lithotripsy of a renal stone, and left stent change. SURGEON:  William Hinds. Shantal Fuentes MD    ASSISTANT:  None. ANESTHESIA:  General.    COMPLICATIONS:  None. SPECIMENS REMOVED:  Left ureteral stone. IMPLANTS:  A 4.8-Irish variable-length stent. ESTIMATED BLOOD LOSS:  Minimal.    FINDINGS:  The patient has a huge stone impacted in the distal left ureter that was lasered and extracted. He also had a large stone impacted in the mid left ureter that was lasered and extracted. He also had small renal stone that was all lasered and most fragments extracted. CLINICAL NOTE:  The patient is a gentleman with recent bilateral ureteral stones with hydro and some renal insufficiency, who had a right ureteroscopy and bilateral stent placement a couple of weeks ago. He presents for a completion ureteroscopy on the left side. OPERATIVE REPORT:  Preoperatively, risks and benefits of surgery were described to the patient. The risks include but are not limited to bleeding, infection, injury to the bladder, injury to the ureter, injury to the kidney, and possible need for future procedures. The patient understood the risks and signed the informed consent. The patient was taken to the operating room, placed on the OR table in supine position. He was administered general anesthetic. He was administered intravenous antibiotics. He was placed in dorsal lithotomy position, prepped and draped in the usual sterile manner. A 21-Romanian cystoscope and sheath were then inserted transurethrally atraumatically under direct vision using a 30-degree lens. Panendoscopy was done. Bilateral ureteral orifices were in normal anatomic position. The anterior urethra was normal.  The prostatic urethra was huge with a giant median lobe extending into the bladder. There were grade II bladder trabeculations. No stones, no tumors, no areas of concern within the bladder. There were stents emanating from each ureteral orifice. Using alligator forceps, these stents were grabbed, all at once, and removed. Once both stents were removed, I reinserted the cystoscope. I cannulated the left ureteral orifice with a 5-Romanian open-ended ureteral catheter and retrograde pyelogram was done in the usual manner using 19 mL of Visipaque dye. There was noted to be significant filling defects in the distal ureter consistent with stone. There was another filling defect towards the mid ureter that was also consistent with stone. Proximal to this, there was a mild amount of hydronephrosis with mild blunting of calyces. There were no other filling defects seen in the kidney. Next, I passed a sensor wire through the open-ended catheter up to the kidney. The open-ended catheter was removed. The scope was removed. Then, I inserted a semi-rigid ureteroscope transurethrally atraumatically under direct vision. Once I got into the bladder, I cannulated the left ureteral orifice and immediately came upon a stone that was very tightly wedged. Using a 272-micron holmium laser fiber, I broke up that stone into many small fragments and extracted them out with a Zero-Tip basket. This was difficult just given this huge size of the stone and kind of a difficult angle.   Once I had all those fragments extracted, I then advanced my scope up in towards the mid ureter where the other filling defect was, there was a huge stone there as well that was lasered and all fragments were extracted. This was very tightly wedged within the ureter. By the end of clearing of the ureter, there were no stone fragments seen. I then advanced my scope all the way up a little bit further and did not see any stones and then got almost towards the UPJ. I then removed the semi-rigid scope after passing a sensor wire through the scope up to the kidney. Over that working wire, I then passed a flexible ureteroscope over the wire up in to the kidney. The working wire was removed. Panpyeloscopy was done. There were two small stones that were identified and lasered. All fragments were extracted and there were no significant fragments seen. Every marlee was entered and I did not  see any significant fragments. Next, I slowly backed the scope down the ureter. The ureter was intact and unharmed. I then reinserted the cystoscope. Over the safety wire, I passed a 4.8-Pashto variable-length stent. The wire was removed. Fluoroscopy confirmed the proximal coil was in the kidney and the distal coil was noted to be in the bladder by direct vision. At this point, the patient was taken out of the lithotomy position, revived from anesthesia, and taken to recovery in stable condition.       MD CHUNG [id number]  D:  09/17/2019 10:26  T:  09/17/2019 11:22  JOB #:  [id number]

## 2019-09-23 LAB
CA PHOS MFR STONE: 10 %
COLOR STONE: NORMAL
COM MFR STONE: 90 %
COMMENT, 519994: NORMAL
COMPOSITION, 120440: NORMAL
DISCLAIMER, STO32L: NORMAL
NIDUS STONE QL: NORMAL
SIZE STONE: NORMAL MM
WT STONE: 82.1 MG

## 2021-05-04 ENCOUNTER — TRANSCRIBE ORDER (OUTPATIENT)
Dept: REGISTRATION | Age: 61
End: 2021-05-04

## 2021-05-04 ENCOUNTER — HOSPITAL ENCOUNTER (OUTPATIENT)
Dept: PREADMISSION TESTING | Age: 61
Discharge: HOME | End: 2021-05-04
Payer: OTHER MISCELLANEOUS

## 2021-05-04 DIAGNOSIS — S83.242A ACUTE MEDIAL MENISCUS TEAR OF LEFT KNEE: Primary | ICD-10-CM

## 2021-05-04 DIAGNOSIS — S83.282A ACUTE LATERAL MENISCUS TEAR OF LEFT KNEE: ICD-10-CM

## 2021-05-04 DIAGNOSIS — Z01.812 BLOOD TESTS PRIOR TO TREATMENT OR PROCEDURE: ICD-10-CM

## 2021-05-04 LAB
ATRIAL RATE: 52 BPM
CALCULATED P AXIS, ECG09: 58 DEGREES
CALCULATED R AXIS, ECG10: -20 DEGREES
CALCULATED T AXIS, ECG11: 60 DEGREES
DIAGNOSIS, 93000: NORMAL
HCT VFR BLD AUTO: 46.5 % (ref 36–48)
HGB BLD-MCNC: 15.8 G/DL (ref 13–16)
P-R INTERVAL, ECG05: 184 MS
POTASSIUM SERPL-SCNC: 4.2 MMOL/L (ref 3.5–5.5)
Q-T INTERVAL, ECG07: 448 MS
QRS DURATION, ECG06: 92 MS
QTC CALCULATION (BEZET), ECG08: 416 MS
VENTRICULAR RATE, ECG03: 52 BPM

## 2021-05-04 PROCEDURE — 36415 COLL VENOUS BLD VENIPUNCTURE: CPT

## 2021-05-04 PROCEDURE — 84132 ASSAY OF SERUM POTASSIUM: CPT

## 2021-05-04 PROCEDURE — 93005 ELECTROCARDIOGRAM TRACING: CPT

## 2021-05-04 PROCEDURE — 85018 HEMOGLOBIN: CPT

## (undated) DEVICE — CATHETER URET L70CM OD6FR OPN END FLEXIMA

## (undated) DEVICE — SOLUTION IRRIG 3000ML 0.9% SOD CHL FLX CONT 0797208] ICU MEDICAL INC]

## (undated) DEVICE — CYSTO PACK: Brand: MEDLINE INDUSTRIES, INC.

## (undated) DEVICE — STRAP,POSITIONING,KNEE/BODY,FOAM,4X60": Brand: MEDLINE

## (undated) DEVICE — URETERAL ACCESS SHEATH SET: Brand: NAVIGATOR

## (undated) DEVICE — SEAL ENDOSCP INSTR 7FR BX SELF SEAL

## (undated) DEVICE — NITINOL STONE RETRIEVAL BASKET: Brand: ZERO TIP

## (undated) DEVICE — URETERAL ACCESS SHEATH SET: Brand: NAVIGATOR HD

## (undated) DEVICE — STERILE POLYISOPRENE POWDER-FREE SURGICAL GLOVES: Brand: PROTEXIS

## (undated) DEVICE — DUAL LUMEN URETERAL CATHETER

## (undated) DEVICE — TRAP MUCUS SPECIMEN 40ML -- MEDICHOICE

## (undated) DEVICE — GARMENT,MEDLINE,DVT,INT,CALF,MED, GEN2: Brand: MEDLINE

## (undated) DEVICE — GDWIRE 3CM FLX-TIP 0.038X150CM -- BX/5 SENSOR

## (undated) DEVICE — DRAPE XR C ARM 41X74IN LF --

## (undated) DEVICE — INFLATION DEVICE: Brand: ENCORE 26

## (undated) DEVICE — GDWIRE UROL STR 150CM FLX TP -- BX/5 SENSOR